# Patient Record
Sex: FEMALE | Race: BLACK OR AFRICAN AMERICAN | NOT HISPANIC OR LATINO | URBAN - METROPOLITAN AREA
[De-identification: names, ages, dates, MRNs, and addresses within clinical notes are randomized per-mention and may not be internally consistent; named-entity substitution may affect disease eponyms.]

---

## 2018-05-23 ENCOUNTER — OFFICE VISIT - HEALTHEAST (OUTPATIENT)
Dept: FAMILY MEDICINE | Facility: CLINIC | Age: 43
End: 2018-05-23

## 2018-05-23 DIAGNOSIS — N30.01 ACUTE CYSTITIS WITH HEMATURIA: ICD-10-CM

## 2018-05-23 DIAGNOSIS — R80.9 PROTEINURIA: ICD-10-CM

## 2018-05-23 LAB
ALBUMIN UR-MCNC: ABNORMAL MG/DL
APPEARANCE UR: CLEAR
BACTERIA #/AREA URNS HPF: ABNORMAL HPF
BILIRUB UR QL STRIP: NEGATIVE
COLOR UR AUTO: YELLOW
GLUCOSE UR STRIP-MCNC: ABNORMAL MG/DL
HGB UR QL STRIP: ABNORMAL
KETONES UR STRIP-MCNC: NEGATIVE MG/DL
LEUKOCYTE ESTERASE UR QL STRIP: ABNORMAL
NITRATE UR QL: NEGATIVE
PH UR STRIP: 6 [PH] (ref 5–8)
RBC #/AREA URNS AUTO: ABNORMAL HPF
SP GR UR STRIP: 1.01 (ref 1–1.03)
SQUAMOUS #/AREA URNS AUTO: ABNORMAL LPF
UROBILINOGEN UR STRIP-ACNC: ABNORMAL
WBC #/AREA URNS AUTO: ABNORMAL HPF
WBC CLUMPS #/AREA URNS HPF: PRESENT /[HPF]

## 2018-05-23 RX ORDER — PHENAZOPYRIDINE HYDROCHLORIDE 100 MG/1
100 TABLET, FILM COATED ORAL 3 TIMES DAILY PRN
Qty: 20 TABLET | Refills: 0 | Status: SHIPPED | OUTPATIENT
Start: 2018-05-23

## 2018-05-23 ASSESSMENT — MIFFLIN-ST. JEOR: SCORE: 1262.34

## 2018-05-26 ENCOUNTER — AMBULATORY - HEALTHEAST (OUTPATIENT)
Dept: FAMILY MEDICINE | Facility: CLINIC | Age: 43
End: 2018-05-26

## 2018-05-26 LAB — BACTERIA SPEC CULT: ABNORMAL

## 2018-06-01 ENCOUNTER — COMMUNICATION - HEALTHEAST (OUTPATIENT)
Dept: FAMILY MEDICINE | Facility: CLINIC | Age: 43
End: 2018-06-01

## 2021-06-01 VITALS — BODY MASS INDEX: 29.68 KG/M2 | WEIGHT: 151.2 LBS | HEIGHT: 60 IN

## 2021-06-16 PROBLEM — E11.9 TYPE 2 DIABETES MELLITUS WITHOUT COMPLICATION, WITHOUT LONG-TERM CURRENT USE OF INSULIN (H): Status: ACTIVE | Noted: 2018-01-30

## 2021-06-18 NOTE — PROGRESS NOTES
ASSESSMENT/PLAN:   1. Acute cystitis with hematuria  Urinalysis-UC if Indicated    Culture, Urine    cephalexin (KEFLEX) 500 MG capsule    phenazopyridine (PYRIDIUM) 100 MG tablet   2. Proteinuria         The patient's symptoms and laboratory studies suggest UTI  Patient has had some low back pain, however this has been bilateral in nature with no associated flank or abdominal pain. No fevers. Some blood in the urine, however low suspicion for kidney stone at this point. Nothing on history to suggest ovarian/testicular torsion, PID, appendicitis, diverticulitis, kidney injury, glomerular bleeding, or any other urgent or emergent condition. Proteinuria is present, however patient notes this is chronic for her and she follows with nephrology every 2 months for this.  She did self discontinue her metformin about one month ago, glucose present in the urine today.  Patient did just eat, declines A1C today as she prefers to follow up with her PCP.  I did recommend she restart her metformin.  Urine culture pending.    The patient is vitally stable and appropriate for outpatient antibiotic therapy. I have prescribed Keflex for the patient. I educated the patient to drink plenty of fluids and to take a probiotic while taking antibiotics.    I educated the patient on warning signs for immediate follow up including fevers/chills, nausea, vomiting, hematuria, abdominal pain, altered mental status. I educated the patient to otherwise follow up with primary care doctor as needed or if symptoms do not improve. Please view below patient instructions for patient education and return precautions as were discussed during visit.  Patient understood and agreed. Patient was stable for discharge.      Patient Instructions:  Patient Instructions   Your urine test shows evidence of a urinary tract infection.    We will treat you with an antibiotic, Keflex.  I sent this to your pharmacy. Please take as directed for 7 days. Please take a  probiotic or eat a daily Greek yogurt while you are on the antibiotic.    If developing high fevers, vomiting, abdominal pain, or any other new, concerning symptoms, come back immediately. If no improvement in symptoms by the end of your antibiotic treatment, follow up with your primary care doctor.    You do have protein as well as sugar in your urine.  I would recommend you restart your metformin, and get in with your primary this week to have your A1C rechecked.         Urinary Tract Infections in Women  Urinary tract infections (UTIs) are most often caused by bacteria (germs). These bacteria enter the urinary tract. The bacteria may come from outside the body. Or they may travel from the skin outside the rectum or vagina into the urethra. Female anatomy makes it easier for bacteria from the bowel to enter a woman s urinary tract, which is the most common source of UTI. This means women develop UTIs more often than men. Pain in or around the urinary tract is a common UTI symptom. But the only way to know for sure if you have a UTI for the health care provider to test your urine. The two tests that may be done are the urinalysis and urine culture.  Types of UTIs    Cystitis: A bladder infection (cystitis) is the most common UTI in women. You may have urgent or frequent urination. You may also have pain, burning when you urinate, and bloody urine.    Urethritis: This is an inflamed urethra, which is the tube that carries urine from the bladder to outside the body. You may have lower stomach or back pain. You may also have urgent or frequent urination.    Pyelonephritis: This is a kidney infection. If not treated, it can be serious and damage your kidneys. In severe cases, you may be hospitalized. You may have a fever and lower back pain.  Medications to treat a UTI  Most UTIs are treated with antibiotics. These kill the bacteria. The length of time you need to take them depends on the type of infection. It may be  as short as 3 days. If you have repeated UTIs, a low-dose antibiotic may be needed for several months. Take antibiotics exactly as directed. Don t stop taking them until all of the medication is gone. If you stop taking the antibiotic too soon, the infection may not go away, and you may develop a resistance to the antibiotic. This can make it much harder to treat.  Lifestyle changes to treat and prevent UTIs  The lifestyle changes below will help get rid of your UTI. They may also help prevent future UTIs.    Drink plenty of fluids. This includes water, juice, or other caffeine-free drinks. Fluids help flush bacteria out of your body.    Empty your bladder. Always empty your bladder when you feel the urge to urinate. And always urinate before going to sleep. Urine that stays in your bladder can lead to infection. Try to urinate before and after sex as well.    Practice good personal hygiene. Wipe yourself from front to back after using the toilet. This helps keep bacteria from getting into the urethra.    Use condoms during sex. These help prevent UTIs caused by sexually transmitted bacteria. Also, avoid using spermicides during sex. These can increase the risk of UTIs. Choose other forms of birth control instead. For women who tend to get UTIs after sex, a low-dose of a preventive antibiotic may be used. Be sure to discuss this option with your health care provider.    Follow up with your health care provider as directed. He or she may test to make sure the infection has cleared. If necessary, additional treatment may be started.    6373-2399 [employer]. [address] [place] [address], [place] [address]. All rights reserved. This information is not intended as a substitute for professional medical care. Always follow your healthcare professional's instructions.        SUBJECTIVE:   Mark Chan is a 42 y.o. female who presents today for evaluation of 2 days of urinary frequency as well as dysuria.   "No measured fever, however she feels \"warm\" all over.  Some mild low back pain.  No abdominal pain. Denies change in vaginal discharge, itching.  No hematuria.  Has not had a history of frequent UTI, notes she does not recall having ever had one.  Has had chronic proteinuria, follows with nephrology. Self discontinued her metformin about one month ago, did not discuss this with her PCP prior to doing so.    Past Medical History:  There is no problem list on file for this patient.  NIDDM - off metformin now.      Surgical History:  none  Reviewed; Non-contributory    Family History:  No family history on file.  Mom - arthritis  Father- healthy  Reviewed; Non-contributory      Social History:    History   Smoking Status     Not on file   Smokeless Tobacco     Not on file         Current Medications:  No current outpatient prescriptions on file prior to visit.     No current facility-administered medications on file prior to visit.        Allergies:   No Known Allergies    I personally reviewed patient's past medical, surgical, social, family history and allergies.    ROS:  Review of Systems  An 8point review of systems was conducted and otherwise negative unless noted in HPI.          OBJECTIVE:   /72 (Patient Site: Right Arm, Patient Position: Sitting, Cuff Size: Adult Regular)  Pulse 66  Temp 97.9  F (36.6  C) (Oral)   Resp 18  Ht 5' (1.524 m)  Wt 151 lb 3.2 oz (68.6 kg)  SpO2 98%  BMI 29.53 kg/m2      General Appearance:  Alert,  well-appearing female in NAD. Afebrile.    Integument: Warm, dry  HEENT: Moist mucus membranes.  Respiratory: No distress. Lungs clear to ausculation bilaterally. No crackles, wheezes, rhonchi or stridor.  Cardiovascular: Regular rate and rhythm, no murmur, rub or gallop. No obvious chest wall deformities. Peripheral pulses 2+ bilaterally. No peripheral edema.  GI: Soft, nontender, normal bowel sounds. No masses, organomegaly, rigidity, or guarding. No CVAT.  Neurologic: Alert " and orientated appropriately. No focal deficits. Follows commands.          Radiology:  I personally ordered and viewed this study. I agree with below radiology findings.    none      Laboratory Studies:  I personally ordered and interpreted these studies.    Results for orders placed or performed in visit on 05/23/18   Urinalysis-UC if Indicated   Result Value Ref Range    Color, UA Yellow Colorless, Yellow, Straw, Light Yellow    Clarity, UA Clear Clear    Glucose,  mg/dL (!) Negative    Bilirubin, UA Negative Negative    Ketones, UA Negative Negative    Specific Gravity, UA 1.015 1.005 - 1.030    Blood, UA Moderate (!) Negative    pH, UA 6.0 5.0 - 8.0    Protein, UA >=300 mg/dL (!) Negative mg/dL    Urobilinogen, UA 0.2 E.U./dL 0.2 E.U./dL, 1.0 E.U./dL    Nitrite, UA Negative Negative    Leukocytes, UA Small (!) Negative    Bacteria, UA Moderate (!) None Seen hpf    RBC, UA 25-50 (!) None Seen, 0-2 hpf    WBC, UA 10-25 (!) None Seen, 0-5 hpf    Squam Epithel, UA 0-5 None Seen, 0-5 lpf    WBC Clumps Present (!) None Seen       Debbie Tee, CNP